# Patient Record
Sex: FEMALE | Race: WHITE | URBAN - METROPOLITAN AREA
[De-identification: names, ages, dates, MRNs, and addresses within clinical notes are randomized per-mention and may not be internally consistent; named-entity substitution may affect disease eponyms.]

---

## 2020-10-20 ENCOUNTER — OFFICE VISIT (OUTPATIENT)
Dept: URBAN - METROPOLITAN AREA CLINIC 36 | Facility: CLINIC | Age: 75
End: 2020-10-20
Payer: MEDICARE

## 2020-10-20 PROCEDURE — 92014 COMPRE OPH EXAM EST PT 1/>: CPT | Performed by: OPHTHALMOLOGY

## 2020-10-20 PROCEDURE — 92134 CPTRZ OPH DX IMG PST SGM RTA: CPT | Performed by: OPHTHALMOLOGY

## 2020-10-20 PROCEDURE — 67028 INJECTION EYE DRUG: CPT | Performed by: OPHTHALMOLOGY

## 2020-10-20 ASSESSMENT — INTRAOCULAR PRESSURE
OS: 19
OD: 23

## 2020-10-20 NOTE — IMPRESSION/PLAN
Impression: Age-related nuclear cataract, left eye: H25.12. Plan: Patient notes increased issues with reading. There is mild progression of the cataract. Recommend continued observation given good vision.

## 2020-10-20 NOTE — IMPRESSION/PLAN
Impression: Glaucoma Suspect Plan: Patient is a glaucoma suspect based on IOP and ON appearance. Recommend increased use of timolol OD to BID as IOP remains borderline, ras OD.

## 2020-10-20 NOTE — IMPRESSION/PLAN
Impression: H/o Macular cyst, hole, or pseudohole, right eye: H35.341. OD.
s/p PPV, MP, GAS x MH OD Plan: stable.

## 2020-10-20 NOTE — IMPRESSION/PLAN
Impression: Central retinal vein occlusion, bi, with macular edema: H34.8130. OD: h/o ischemic CRVO with CME; h/o injections without improvement. OS:  h/o Avastin OS q 6 wks (Dr. Dk Lew) -s/p Eylea OS last 08/18/20 -hx of recurrence at 9.5 weeks Plan: OD: Exam and OCT demonstrates stable CME. Patient notes no improvement in past with Anti-VEGF injections, so will continue with observation. 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 OS: Exam and OCT demonstrates trace recurrence of CME today after last injection 9 weeks ago. Recommend continuing with Eylea today (will use Eylea given accumulation of SO droplets and symptomatic floaters to patient with Avastin); will maintain 8-9 wk treatment interval.  R/B/A's discussed. Patient elects to proceed. Intravitreal Eylea injected OS today without complication. RTC: 8-9 straight Eylea OS.  #2/3

## 2020-10-20 NOTE — IMPRESSION/PLAN
Impression: Puckering of macula, left eye: H35.372. OS. Plan: Exam and OCT demonstrate a stable Macular Pucker. The diagnosis, natural history, and prognosis of ERMs, as well as the risks and benefits of PPV/MP versus observation were discussed at length. Given the patient's current visual acuity and minimal hindrance on activities of daily living, observation was recommended at this time. Patient notes occasional blurriness OS.   Fortunately, the ERM has not worsened and vision remains 20/20/

## 2020-10-20 NOTE — IMPRESSION/PLAN
Impression: Dry eye syndrome of bilateral lacrimal glands: H04.123. Plan: Recommend using AT's as needed.

## 2020-12-15 ENCOUNTER — PROCEDURE (OUTPATIENT)
Dept: URBAN - METROPOLITAN AREA CLINIC 36 | Facility: CLINIC | Age: 75
End: 2020-12-15
Payer: MEDICARE

## 2020-12-15 PROCEDURE — 67028 INJECTION EYE DRUG: CPT | Performed by: OPHTHALMOLOGY

## 2020-12-15 ASSESSMENT — INTRAOCULAR PRESSURE
OD: 21
OS: 12

## 2021-02-09 ENCOUNTER — PROCEDURE (OUTPATIENT)
Dept: URBAN - METROPOLITAN AREA CLINIC 36 | Facility: CLINIC | Age: 76
End: 2021-02-09
Payer: MEDICARE

## 2021-02-09 PROCEDURE — 67028 INJECTION EYE DRUG: CPT | Performed by: OPHTHALMOLOGY

## 2021-02-09 PROCEDURE — 92134 CPTRZ OPH DX IMG PST SGM RTA: CPT | Performed by: OPHTHALMOLOGY

## 2021-02-09 ASSESSMENT — INTRAOCULAR PRESSURE
OS: 13
OD: 20

## 2021-04-06 ENCOUNTER — OFFICE VISIT (OUTPATIENT)
Dept: URBAN - METROPOLITAN AREA CLINIC 36 | Facility: CLINIC | Age: 76
End: 2021-04-06
Payer: MEDICARE

## 2021-04-06 DIAGNOSIS — Z96.1 PRESENCE OF INTRAOCULAR LENS: ICD-10-CM

## 2021-04-06 PROCEDURE — 92014 COMPRE OPH EXAM EST PT 1/>: CPT | Performed by: OPHTHALMOLOGY

## 2021-04-06 PROCEDURE — 67028 INJECTION EYE DRUG: CPT | Performed by: OPHTHALMOLOGY

## 2021-04-06 PROCEDURE — 92134 CPTRZ OPH DX IMG PST SGM RTA: CPT | Performed by: OPHTHALMOLOGY

## 2021-04-06 ASSESSMENT — INTRAOCULAR PRESSURE
OD: 18
OS: 20

## 2021-04-06 NOTE — IMPRESSION/PLAN
Impression: Central retinal vein occlusion, bi, with macular edema: H34.8130. OD: h/o ischemic CRVO with CME; h/o injections without improvement. OS:  h/o Avastin OS q 6 wks (Dr. Roman Portillo) -s/p Eylea OS last 02/09/2021 -hx of recurrence at 9.5 weeks Plan: OD: Exam and OCT demonstrates stable CME. Patient notes no improvement in past with Anti-VEGF injections, so will continue with observation. 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 OS: Exam and OCT demonstrates reslved CME today after last injection 8 weeks ago. Recommend continuing with Eylea today (will use Eylea given accumulation of SO droplets and symptomatic floaters to patient with Avastin); will try to extend to 9 wk treatment interval.  R/B/A's discussed. Patient elects to proceed. Intravitreal Eylea injected OS today without complication.  

RTC: 9 wk re-eval with OCT OU, poss Eylea OS

## 2021-04-06 NOTE — IMPRESSION/PLAN
Impression: Puckering of macula, left eye: H35.372. OS. Plan: Exam and OCT demonstrate a stable Macular Pucker. The diagnosis, natural history, and prognosis of ERMs, as well as the risks and benefits of PPV/MP versus observation were discussed at length. Given the patient's current visual acuity and minimal hindrance on activities of daily living, observation was recommended at this time. Patient notes occasional blurriness OS.   Fortunately, the ERM has not worsened and vision remains 20/20

## 2021-04-06 NOTE — IMPRESSION/PLAN
Impression: Glaucoma Suspect Plan: Patient is a glaucoma suspect based on IOP and ON appearance. Recommend increased use of timolol OD to BID as IOP remains borderline, ras OD.  Also follows with Dr. Preeti Dailey

## 2021-06-08 ENCOUNTER — OFFICE VISIT (OUTPATIENT)
Dept: URBAN - METROPOLITAN AREA CLINIC 36 | Facility: CLINIC | Age: 76
End: 2021-06-08
Payer: MEDICARE

## 2021-06-08 DIAGNOSIS — H34.8130 CENTRAL RETINAL VEIN OCCLUSION, BI, WITH MACULAR EDEMA: Primary | ICD-10-CM

## 2021-06-08 DIAGNOSIS — H04.123 DRY EYE SYNDROME OF BILATERAL LACRIMAL GLANDS: ICD-10-CM

## 2021-06-08 DIAGNOSIS — H35.341 MACULAR CYST, HOLE, OR PSEUDOHOLE, RIGHT EYE: ICD-10-CM

## 2021-06-08 PROCEDURE — 92134 CPTRZ OPH DX IMG PST SGM RTA: CPT | Performed by: OPHTHALMOLOGY

## 2021-06-08 PROCEDURE — 92012 INTRM OPH EXAM EST PATIENT: CPT | Performed by: OPHTHALMOLOGY

## 2021-06-08 PROCEDURE — 67028 INJECTION EYE DRUG: CPT | Performed by: OPHTHALMOLOGY

## 2021-06-08 ASSESSMENT — INTRAOCULAR PRESSURE
OS: 23
OD: 26

## 2021-06-08 NOTE — IMPRESSION/PLAN
Impression: Central retinal vein occlusion, bi, with macular edema: H34.8130. OD: h/o ischemic CRVO with CME; h/o injections without improvement. OS:  h/o Avastin OS q 6 wks (Dr. Nathalie Lombardi) -s/p Eylea OS last 04/06/2021 -hx of recurrence at 9.5 weeks Plan: OD: Exam and OCT demonstrates stable CME. Patient notes no improvement in past with Anti-VEGF injections, so will continue with observation. 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 OS: Exam and OCT demonstrates reslved CME today after last injection 9 weeks ago. Recommend continuing with Eylea today (will use Eylea given accumulation of SO droplets and symptomatic floaters to patient with Avastin); will try to extend to 10 wk treatment interval.  R/B/A's discussed. Patient elects to proceed. Intravitreal Eylea injected OS today without complication.  

RTC: 10 wk re-eval with OCT OU, poss Eylea OS

## 2021-06-08 NOTE — IMPRESSION/PLAN
Impression: Glaucoma Suspect Plan: Patient is a glaucoma suspect based on IOP and ON appearance. Recommend increased use of timolol OD to BID as IOP remains borderline.  Also follows with Dr. Elma Washburn

## 2021-08-17 ENCOUNTER — OFFICE VISIT (OUTPATIENT)
Dept: URBAN - METROPOLITAN AREA CLINIC 36 | Facility: CLINIC | Age: 76
End: 2021-08-17
Payer: MEDICARE

## 2021-08-17 PROCEDURE — 92134 CPTRZ OPH DX IMG PST SGM RTA: CPT | Performed by: OPHTHALMOLOGY

## 2021-08-17 PROCEDURE — 67028 INJECTION EYE DRUG: CPT | Performed by: OPHTHALMOLOGY

## 2021-08-17 PROCEDURE — 92012 INTRM OPH EXAM EST PATIENT: CPT | Performed by: OPHTHALMOLOGY

## 2021-08-17 ASSESSMENT — INTRAOCULAR PRESSURE
OD: 20
OS: 20

## 2021-08-17 NOTE — IMPRESSION/PLAN
Impression: Glaucoma Suspect Plan: Patient is a glaucoma suspect based on IOP and ON appearance. Recommend increased use of timolol OD to BID as IOP remains borderline.  Also follows with Dr. Christelle Montejo

## 2021-08-17 NOTE — IMPRESSION/PLAN
Impression: Central retinal vein occlusion, bi, with macular edema: H34.8130. OD: h/o ischemic CRVO with CME; h/o injections without improvement. OS:  h/o Avastin OS q 6 wks (Dr. Brianne Louise) -s/p Eylea OS last 06/08/2021 -hx of recurrence at 9.5 weeks Plan: OD: Exam and OCT demonstrates stable CME. Patient notes no improvement in past with Anti-VEGF injections, so will continue with observation. 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 OS: Exam and OCT demonstrates very mild recurrence of CME today after extending last injection from 9 to 10 weeks ago. Recommend continuing with Eylea today (will use Eylea given accumulation of SO droplets and symptomatic floaters to patient with Avastin); will taper to 9 wk treatment interval.  R/B/A's discussed. Patient elects to proceed. Intravitreal Eylea injected OS today without complication.  

RTC: 9 wk re-eval with OCT OU, poss Eylea OS

## 2021-10-18 ENCOUNTER — OFFICE VISIT (OUTPATIENT)
Dept: URBAN - METROPOLITAN AREA CLINIC 36 | Facility: CLINIC | Age: 76
End: 2021-10-18
Payer: MEDICARE

## 2021-10-18 DIAGNOSIS — H25.12 AGE-RELATED NUCLEAR CATARACT, LEFT EYE: ICD-10-CM

## 2021-10-18 PROCEDURE — 67028 INJECTION EYE DRUG: CPT | Performed by: OPHTHALMOLOGY

## 2021-10-18 PROCEDURE — 92134 CPTRZ OPH DX IMG PST SGM RTA: CPT | Performed by: OPHTHALMOLOGY

## 2021-10-18 PROCEDURE — 92012 INTRM OPH EXAM EST PATIENT: CPT | Performed by: OPHTHALMOLOGY

## 2021-10-18 ASSESSMENT — INTRAOCULAR PRESSURE
OS: 18
OD: 20

## 2021-10-18 NOTE — IMPRESSION/PLAN
Impression: Central retinal vein occlusion, bi, with macular edema: H34.8130. OD: h/o ischemic CRVO with CME; h/o injections without improvement. OS:  h/o Avastin OS q 6 wks (Dr. Neftaly Greer) -s/p Eylea OS last 08/17/2021 -hx of recurrence at 9.5 weeks Plan: OD: Exam and OCT demonstrates stable CME. Patient notes no improvement in past with Anti-VEGF injections, so will continue with observation. 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 OS: Exam and OCT demonstrates very mild recurrence of CME today after extending last injection from 9 to 10 weeks ago. Recommend continuing with Eylea today (will use Eylea given accumulation of SO droplets and symptomatic floaters to patient with Avastin); will taper to 9 wk treatment interval.  R/B/A's discussed. Patient elects to proceed. Intravitreal Eylea injected OS today without complication.  

RTC: 9 wk re-eval with OCT OU, poss Eylea OS

## 2021-10-18 NOTE — IMPRESSION/PLAN
Impression: Glaucoma Suspect Plan: Patient is a glaucoma suspect based on IOP and ON appearance. Recommend increased use of timolol OD to BID as IOP remains borderline.  Also follows with Dr. Glenda Bales

## 2021-10-18 NOTE — IMPRESSION/PLAN
Impression: Dry eye syndrome of bilateral lacrimal glands: H04.123. Plan: Patient notes occ pain and tearing OD. Exam demonstrates PEE. Discussed R/B/A of ATs, PFATs, Restasis, vs punctal plugs.  Rec increased ATs

## 2022-02-21 ENCOUNTER — OFFICE VISIT (OUTPATIENT)
Dept: URBAN - METROPOLITAN AREA CLINIC 36 | Facility: CLINIC | Age: 77
End: 2022-02-21
Payer: MEDICARE

## 2022-02-21 DIAGNOSIS — H35.372 PUCKERING OF MACULA, LEFT EYE: ICD-10-CM

## 2022-02-21 DIAGNOSIS — H40.9 GLAUCOMA: ICD-10-CM

## 2022-02-21 PROCEDURE — 92014 COMPRE OPH EXAM EST PT 1/>: CPT | Performed by: OPHTHALMOLOGY

## 2022-02-21 PROCEDURE — 67028 INJECTION EYE DRUG: CPT | Performed by: OPHTHALMOLOGY

## 2022-02-21 PROCEDURE — 92134 CPTRZ OPH DX IMG PST SGM RTA: CPT | Performed by: OPHTHALMOLOGY

## 2022-02-21 ASSESSMENT — INTRAOCULAR PRESSURE
OS: 20
OD: 23

## 2022-02-21 NOTE — IMPRESSION/PLAN
Impression: Central retinal vein occlusion, bi, with macular edema: H34.8130. OD: h/o ischemic CRVO with CME; h/o injections without improvement. OS:  h/o Avastin OS q 6 wks (Dr. Samson Rivas) -s/p Eylea OS last 12/20/2021 -hx of recurrence at 9.5 weeks Plan: OD: Exam and OCT demonstrates stable CME. Patient notes no improvement in past with Anti-VEGF injections, so will continue with observation. 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 OS: Exam and OCT demonstrates very mild recurrence of CME today after extending last injection from 9 to 10 weeks ago. Recommend continuing with Eylea today (will use Eylea given accumulation of SO droplets and symptomatic floaters to patient with Avastin); will taper to 9 wk treatment interval.  R/B/A's discussed. Patient elects to proceed. Intravitreal Eylea injected OS today without complication.  

RTC: 9 wk re-eval with OCT OU, poss Eylea OS

## 2022-02-21 NOTE — IMPRESSION/PLAN
Impression: Glaucoma Suspect Plan: Patient is a glaucoma suspect based on IOP and ON appearance. Recommend increased use of timolol OD to BID as IOP remains borderline.  Also follows with Dr. Tom Schwab

## 2022-04-25 ENCOUNTER — OFFICE VISIT (OUTPATIENT)
Dept: URBAN - METROPOLITAN AREA CLINIC 36 | Facility: CLINIC | Age: 77
End: 2022-04-25
Payer: MEDICARE

## 2022-04-25 DIAGNOSIS — H35.372 PUCKERING OF MACULA, LEFT EYE: ICD-10-CM

## 2022-04-25 DIAGNOSIS — H34.8130 CENTRAL RETINAL VEIN OCCLUSION, BI, WITH MACULAR EDEMA: Primary | ICD-10-CM

## 2022-04-25 DIAGNOSIS — H35.341 MACULAR CYST, HOLE, OR PSEUDOHOLE, RIGHT EYE: ICD-10-CM

## 2022-04-25 DIAGNOSIS — H04.123 DRY EYE SYNDROME OF BILATERAL LACRIMAL GLANDS: ICD-10-CM

## 2022-04-25 DIAGNOSIS — H40.9 GLAUCOMA: ICD-10-CM

## 2022-04-25 PROCEDURE — 92012 INTRM OPH EXAM EST PATIENT: CPT | Performed by: OPHTHALMOLOGY

## 2022-04-25 PROCEDURE — 67028 INJECTION EYE DRUG: CPT | Performed by: OPHTHALMOLOGY

## 2022-04-25 PROCEDURE — 92134 CPTRZ OPH DX IMG PST SGM RTA: CPT | Performed by: OPHTHALMOLOGY

## 2022-04-25 ASSESSMENT — INTRAOCULAR PRESSURE
OD: 18
OS: 17

## 2022-04-25 NOTE — IMPRESSION/PLAN
Impression: Glaucoma Suspect Plan: Patient is a glaucoma suspect based on IOP and ON appearance. Recommend increased use of timolol OD to BID as IOP remains borderline.  Also follows with Dr. Sotero Acosta

## 2022-04-25 NOTE — IMPRESSION/PLAN
Impression: Central retinal vein occlusion, bi, with macular edema: H34.8130. OD: h/o ischemic CRVO with CME; h/o injections without improvement. OS:  h/o Avastin OS q 6 wks (Dr. Mery Yang) -s/p Eylea OS last 02/21/2022 -hx of recurrence at 9.5 weeks Plan: OD: Exam and OCT demonstrates stable CME. Patient notes no improvement in past with Anti-VEGF injections, so will continue with observation. 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 OS: Exam and OCT demonstrates very mild persistence of CME today after tapering last injection from 10 to 9 weeks ago. Recommend continuing with Eylea today (will use Eylea given accumulation of SO droplets and symptomatic floaters to patient with Avastin); will maintain 9 wk treatment interval.  R/B/A's discussed. Patient elects to proceed. Intravitreal Eylea injected OS today without complication.  

RTC: 9 wk re-eval with OCT OU, poss Eylea OS

## 2022-06-27 ENCOUNTER — OFFICE VISIT (OUTPATIENT)
Dept: URBAN - METROPOLITAN AREA CLINIC 36 | Facility: CLINIC | Age: 77
End: 2022-06-27
Payer: MEDICARE

## 2022-06-27 DIAGNOSIS — H34.8130 CENTRAL RETINAL VEIN OCCLUSION, BI, WITH MACULAR EDEMA: Primary | ICD-10-CM

## 2022-06-27 DIAGNOSIS — H40.9 GLAUCOMA: ICD-10-CM

## 2022-06-27 DIAGNOSIS — H35.341 MACULAR CYST, HOLE, OR PSEUDOHOLE, RIGHT EYE: ICD-10-CM

## 2022-06-27 DIAGNOSIS — H04.123 DRY EYE SYNDROME OF BILATERAL LACRIMAL GLANDS: ICD-10-CM

## 2022-06-27 DIAGNOSIS — H35.372 PUCKERING OF MACULA, LEFT EYE: ICD-10-CM

## 2022-06-27 PROCEDURE — 92134 CPTRZ OPH DX IMG PST SGM RTA: CPT | Performed by: OPHTHALMOLOGY

## 2022-06-27 PROCEDURE — 67028 INJECTION EYE DRUG: CPT | Performed by: OPHTHALMOLOGY

## 2022-06-27 PROCEDURE — 92012 INTRM OPH EXAM EST PATIENT: CPT | Performed by: OPHTHALMOLOGY

## 2022-06-27 NOTE — IMPRESSION/PLAN
Impression: Central retinal vein occlusion, bi, with macular edema: H34.8130. OD: h/o ischemic CRVO with CME; h/o injections without improvement. OS:  h/o Avastin OS q 6 wks (Dr. Susan Seay) -s/p Eylea OS last 02/21/2022 -hx of recurrence at 9.5 weeks Plan: OD: Exam and OCT demonstrates stable CME. Patient notes no improvement in past with Anti-VEGF injections, so will continue with observation. 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 OS: Exam and OCT demonstrates very mild persistence of CME today after tapering last injection from 10 to 9 weeks ago. Recommend continuing with Eylea today (will use Eylea given accumulation of SO droplets and symptomatic floaters to patient with Avastin); will maintain 9 wk treatment interval.  R/B/A's discussed. Patient elects to proceed. Intravitreal Eylea injected OS today without complication.  

RTC: 9 wk re-eval with OCT OU, poss Eylea OS

## 2022-08-22 ENCOUNTER — OFFICE VISIT (OUTPATIENT)
Dept: URBAN - METROPOLITAN AREA CLINIC 36 | Facility: CLINIC | Age: 77
End: 2022-08-22
Payer: MEDICARE

## 2022-08-22 DIAGNOSIS — H34.8130 CENTRAL RETINAL VEIN OCCLUSION, BI, WITH MACULAR EDEMA: Primary | ICD-10-CM

## 2022-08-22 DIAGNOSIS — H35.341 MACULAR CYST, HOLE, OR PSEUDOHOLE, RIGHT EYE: ICD-10-CM

## 2022-08-22 DIAGNOSIS — H40.9 GLAUCOMA: ICD-10-CM

## 2022-08-22 DIAGNOSIS — H04.123 DRY EYE SYNDROME OF BILATERAL LACRIMAL GLANDS: ICD-10-CM

## 2022-08-22 DIAGNOSIS — H35.372 PUCKERING OF MACULA, LEFT EYE: ICD-10-CM

## 2022-08-22 PROCEDURE — 99214 OFFICE O/P EST MOD 30 MIN: CPT | Performed by: OPHTHALMOLOGY

## 2022-08-22 PROCEDURE — 92134 CPTRZ OPH DX IMG PST SGM RTA: CPT | Performed by: OPHTHALMOLOGY

## 2022-08-22 PROCEDURE — 67028 INJECTION EYE DRUG: CPT | Performed by: OPHTHALMOLOGY

## 2022-08-22 ASSESSMENT — INTRAOCULAR PRESSURE
OD: 17
OS: 15

## 2022-08-22 NOTE — IMPRESSION/PLAN
Impression: Puckering of macula, left eye: H35.372. OS. Plan: Exam and OCT demonstrate a stable Macular Pucker. The diagnosis, natural history, and prognosis of ERMs, as well as the risks and benefits of PPV/MP versus observation were discussed at length. Given the patient's current visual acuity and minimal hindrance on activities of daily living, observation was recommended at this time. Patient notes occasional blurriness OS.   Fortunately, the ERM has not worsened and vision remains 20/20 Pt. lightheaded and dizzy during orthostatic VS.

## 2022-08-22 NOTE — IMPRESSION/PLAN
Impression: Central retinal vein occlusion, bi, with macular edema: H34.8130. OD: h/o ischemic CRVO with CME; h/o injections without improvement. OS:  h/o Avastin OS q 6 wks (Dr. Tanya Warren) -s/p Eylea OS last 06/27/2022 -hx of recurrence at 9.5 weeks Plan: OD: Exam and OCT demonstrates stable CME. Patient notes no improvement in past with Anti-VEGF injections, so will continue with observation. 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 OS: Exam and OCT demonstrates very mild persistence of CME today after tapering last injection from 10 to 9 weeks ago. Recommend continuing with Eylea today (will use Eylea given accumulation of SO droplets and symptomatic floaters to patient with Avastin); will maintain 9 wk treatment interval.  R/B/A's discussed. Patient elects to proceed. Intravitreal Eylea injected OS today without complication.  

RTC: 9 wk re-eval with OCT OU, poss Eylea OS

## 2022-08-22 NOTE — IMPRESSION/PLAN
Impression: Glaucoma Suspect Plan: Patient is a glaucoma suspect based on IOP and ON appearance. Recommend increased use of timolol OD to BID as IOP remains borderline.  Also follows with Dr. Thomas Serrano

## 2022-10-17 ENCOUNTER — OFFICE VISIT (OUTPATIENT)
Dept: URBAN - METROPOLITAN AREA CLINIC 36 | Facility: CLINIC | Age: 77
End: 2022-10-17
Payer: MEDICARE

## 2022-10-17 DIAGNOSIS — H34.8130 CENTRAL RETINAL VEIN OCCLUSION, BI, WITH MACULAR EDEMA: Primary | ICD-10-CM

## 2022-10-17 DIAGNOSIS — H35.341 MACULAR CYST, HOLE, OR PSEUDOHOLE, RIGHT EYE: ICD-10-CM

## 2022-10-17 DIAGNOSIS — H35.372 PUCKERING OF MACULA, LEFT EYE: ICD-10-CM

## 2022-10-17 DIAGNOSIS — H25.12 AGE-RELATED NUCLEAR CATARACT, LEFT EYE: ICD-10-CM

## 2022-10-17 DIAGNOSIS — H04.123 DRY EYE SYNDROME OF BILATERAL LACRIMAL GLANDS: ICD-10-CM

## 2022-10-17 DIAGNOSIS — H40.9 GLAUCOMA: ICD-10-CM

## 2022-10-17 PROCEDURE — 67028 INJECTION EYE DRUG: CPT | Performed by: OPHTHALMOLOGY

## 2022-10-17 PROCEDURE — 92134 CPTRZ OPH DX IMG PST SGM RTA: CPT | Performed by: OPHTHALMOLOGY

## 2022-10-17 PROCEDURE — 92012 INTRM OPH EXAM EST PATIENT: CPT | Performed by: OPHTHALMOLOGY

## 2022-10-17 ASSESSMENT — INTRAOCULAR PRESSURE
OD: 28
OS: 23

## 2022-10-17 NOTE — IMPRESSION/PLAN
Impression: Central retinal vein occlusion, bi, with macular edema: H34.8130. OD: h/o ischemic CRVO with CME; h/o injections without improvement. OS:  h/o Avastin OS q 6 wks (Dr. Low Frost) -s/p Eylea OS last 08/22/2022 -hx of recurrence at 9.5 weeks Plan: OD: Exam and OCT demonstrates stable CME. Patient notes no improvement in past with Anti-VEGF injections, so will continue with observation. 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 OS: Exam and OCT demonstrates very mild persistence of CME today after tapering last injection from 10 to 8 weeks ago. Recommend continuing with Eylea today (will use Eylea given accumulation of SO droplets and symptomatic floaters to patient with Avastin); will extend back to 10 wk treatment interval.  R/B/A's discussed. Patient elects to proceed. Intravitreal Eylea injected OS today without complication.  

RTC: 10 wk re-eval with OCT OU, poss Eylea OS

## 2022-10-17 NOTE — IMPRESSION/PLAN
Impression: Glaucoma Suspect Plan: Patient is a glaucoma suspect based on IOP and ON appearance. IOP borderline today. Recommend increased use of timolol OD to BID.  Also follows with Dr. Glenda Balse

## 2022-10-17 NOTE — IMPRESSION/PLAN
Impression: Puckering of macula, left eye: H35.372. OS. Plan: Exam and OCT demonstrate a stable Macular Pucker. The diagnosis, natural history, and prognosis of ERMs, as well as the risks and benefits of PPV/MP versus observation were discussed at length. Given the patient's current visual acuity and minimal hindrance on activities of daily living, observation was recommended at this time. Patient notes occasional blurriness OS.   Fortunately, the ERM has not worsened and vision remains decent

## 2022-12-12 ENCOUNTER — OFFICE VISIT (OUTPATIENT)
Dept: URBAN - METROPOLITAN AREA CLINIC 36 | Facility: CLINIC | Age: 77
End: 2022-12-12
Payer: MEDICARE

## 2022-12-12 DIAGNOSIS — H34.8130 CENTRAL RETINAL VEIN OCCLUSION, BI, WITH MACULAR EDEMA: Primary | ICD-10-CM

## 2022-12-12 DIAGNOSIS — H04.123 DRY EYE SYNDROME OF BILATERAL LACRIMAL GLANDS: ICD-10-CM

## 2022-12-12 DIAGNOSIS — H35.341 MACULAR CYST, HOLE, OR PSEUDOHOLE, RIGHT EYE: ICD-10-CM

## 2022-12-12 DIAGNOSIS — H40.9 GLAUCOMA: ICD-10-CM

## 2022-12-12 DIAGNOSIS — H35.372 PUCKERING OF MACULA, LEFT EYE: ICD-10-CM

## 2022-12-12 PROCEDURE — 99214 OFFICE O/P EST MOD 30 MIN: CPT | Performed by: OPHTHALMOLOGY

## 2022-12-12 PROCEDURE — 67028 INJECTION EYE DRUG: CPT | Performed by: OPHTHALMOLOGY

## 2022-12-12 PROCEDURE — 92134 CPTRZ OPH DX IMG PST SGM RTA: CPT | Performed by: OPHTHALMOLOGY

## 2022-12-12 ASSESSMENT — INTRAOCULAR PRESSURE
OS: 13
OD: 14

## 2022-12-12 NOTE — IMPRESSION/PLAN
Impression: Glaucoma Suspect Plan: Patient is a glaucoma suspect based on IOP and ON appearance. IOP borderline today. Recommend increased use of timolol OD to BID.  Also follows with Dr. Yusra Byrnes

## 2022-12-12 NOTE — IMPRESSION/PLAN
Impression: Central retinal vein occlusion, bi, with macular edema: H34.8130. OD: h/o ischemic CRVO with CME; h/o injections without improvement. OS:  h/o Avastin OS q 6 wks (Dr. Bonita Weber) -s/p Eylea OS last 10/17/2022 -hx of recurrence at 9.5 weeks Plan: OD: Exam and OCT demonstrates stable CME. Patient notes no improvement in past with Anti-VEGF injections, so will continue with observation. 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 OS: Exam and OCT demonstrates very mild persistence of CME today after tapering last injection from 10 to 8 weeks ago. Recommend continuing with Eylea today (will use Eylea given accumulation of SO droplets and symptomatic floaters to patient with Avastin); will extend back to 10 wk treatment interval.  R/B/A's discussed. Patient elects to proceed. Intravitreal Eylea injected OS today without complication.  

RTC: 10 wk re-eval with OCT OU, poss Eylea OS

## 2023-02-20 ENCOUNTER — OFFICE VISIT (OUTPATIENT)
Dept: URBAN - METROPOLITAN AREA CLINIC 36 | Facility: CLINIC | Age: 78
End: 2023-02-20
Payer: MEDICARE

## 2023-02-20 DIAGNOSIS — H04.123 DRY EYE SYNDROME OF BILATERAL LACRIMAL GLANDS: ICD-10-CM

## 2023-02-20 DIAGNOSIS — H35.341 MACULAR CYST, HOLE, OR PSEUDOHOLE, RIGHT EYE: ICD-10-CM

## 2023-02-20 DIAGNOSIS — H40.9 GLAUCOMA: ICD-10-CM

## 2023-02-20 DIAGNOSIS — H35.372 PUCKERING OF MACULA, LEFT EYE: ICD-10-CM

## 2023-02-20 DIAGNOSIS — H34.8130 CENTRAL RETINAL VEIN OCCLUSION, BI, WITH MACULAR EDEMA: Primary | ICD-10-CM

## 2023-02-20 PROCEDURE — 67028 INJECTION EYE DRUG: CPT | Performed by: OPHTHALMOLOGY

## 2023-02-20 PROCEDURE — 92134 CPTRZ OPH DX IMG PST SGM RTA: CPT | Performed by: OPHTHALMOLOGY

## 2023-02-20 PROCEDURE — 92012 INTRM OPH EXAM EST PATIENT: CPT | Performed by: OPHTHALMOLOGY

## 2023-02-20 ASSESSMENT — INTRAOCULAR PRESSURE
OD: 21
OS: 15

## 2023-02-20 NOTE — IMPRESSION/PLAN
Impression: Central retinal vein occlusion, bi, with macular edema: H34.8130. OD: h/o ischemic CRVO with CME; h/o injections without improvement. OS:  h/o Avastin OS q 6 wks (Dr. Samson Rivas) -s/p Eylea OS last 12/12/2022 -hx of recurrence at 9.5 weeks Plan: OD: Exam and OCT demonstrates stable CME. Patient notes no improvement in past with Anti-VEGF injections, so will continue with observation. 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 OS: Exam and OCT demonstrates very mild persistence of CME today after tapering last injection from 10 to 8 weeks ago. Recommend continuing with Eylea today (will use Eylea given accumulation of SO droplets and symptomatic floaters to patient with Avastin); will extend back to 10 wk treatment interval.  R/B/A's discussed. Patient elects to proceed. Intravitreal Eylea injected OS today without complication.  

RTC: 10 wk re-eval with OCT OU, poss Eylea OS

## 2023-02-20 NOTE — IMPRESSION/PLAN
Impression: Glaucoma Suspect Plan: Patient is a glaucoma suspect based on IOP and ON appearance. IOP borderline today. Recommend increased use of timolol OD to BID.  Also follows with Dr. Stacey Glover

## 2023-05-01 ENCOUNTER — OFFICE VISIT (OUTPATIENT)
Dept: URBAN - METROPOLITAN AREA CLINIC 36 | Facility: CLINIC | Age: 78
End: 2023-05-01
Payer: MEDICARE

## 2023-05-01 DIAGNOSIS — H35.372 PUCKERING OF MACULA, LEFT EYE: ICD-10-CM

## 2023-05-01 DIAGNOSIS — H25.12 AGE-RELATED NUCLEAR CATARACT, LEFT EYE: ICD-10-CM

## 2023-05-01 DIAGNOSIS — H34.8130 CENTRAL RETINAL VEIN OCCLUSION, BI, WITH MACULAR EDEMA: Primary | ICD-10-CM

## 2023-05-01 DIAGNOSIS — H40.9 GLAUCOMA: ICD-10-CM

## 2023-05-01 DIAGNOSIS — H35.341 MACULAR CYST, HOLE, OR PSEUDOHOLE, RIGHT EYE: ICD-10-CM

## 2023-05-01 DIAGNOSIS — H04.123 DRY EYE SYNDROME OF BILATERAL LACRIMAL GLANDS: ICD-10-CM

## 2023-05-01 PROCEDURE — 92134 CPTRZ OPH DX IMG PST SGM RTA: CPT | Performed by: OPHTHALMOLOGY

## 2023-05-01 PROCEDURE — 92012 INTRM OPH EXAM EST PATIENT: CPT | Performed by: OPHTHALMOLOGY

## 2023-05-01 PROCEDURE — 67028 INJECTION EYE DRUG: CPT | Performed by: OPHTHALMOLOGY

## 2023-05-01 ASSESSMENT — INTRAOCULAR PRESSURE
OD: 20
OS: 17

## 2023-05-01 NOTE — IMPRESSION/PLAN
Impression: Central retinal vein occlusion, bi, with macular edema: H34.8130. OD: h/o ischemic CRVO with CME; h/o injections without improvement. OS:  h/o Avastin OS q 6 wks (Dr. Tanya Warren) -s/p Eylea OS last 02/20/2023 -hx of recurrence at 9.5 weeks Plan: OD: Exam and OCT demonstrates stable CME. Patient notes no improvement in past with Anti-VEGF injections, so will continue with observation. 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 OS: Exam and OCT demonstrates very mild persistence of CME today after extending to 10. Recommend continuing with Eylea today (will use Eylea given accumulation of SO droplets and symptomatic floaters to patient with Avastin); will maintain 10 wk treatment interval.  R/B/A's discussed. Patient elects to proceed. Intravitreal Eylea injected OS today without complication.  

RTC: 10 wk re-eval with OCT OU, poss Eylea OS

## 2023-05-01 NOTE — IMPRESSION/PLAN
Impression: Glaucoma Suspect Plan: Patient is a glaucoma suspect based on IOP and ON appearance. IOP borderline today. Recommend increased use of timolol OD to BID.  Also follows with Dr. Yvette Lomax

## 2023-07-10 ENCOUNTER — OFFICE VISIT (OUTPATIENT)
Dept: URBAN - METROPOLITAN AREA CLINIC 36 | Facility: CLINIC | Age: 78
End: 2023-07-10
Payer: MEDICARE

## 2023-07-10 DIAGNOSIS — H35.341 MACULAR CYST, HOLE, OR PSEUDOHOLE, RIGHT EYE: ICD-10-CM

## 2023-07-10 DIAGNOSIS — H34.8130 CENTRAL RETINAL VEIN OCCLUSION, BI, WITH MACULAR EDEMA: Primary | ICD-10-CM

## 2023-07-10 DIAGNOSIS — H40.9 GLAUCOMA: ICD-10-CM

## 2023-07-10 DIAGNOSIS — H04.123 DRY EYE SYNDROME OF BILATERAL LACRIMAL GLANDS: ICD-10-CM

## 2023-07-10 DIAGNOSIS — H35.372 PUCKERING OF MACULA, LEFT EYE: ICD-10-CM

## 2023-07-10 DIAGNOSIS — H25.12 AGE-RELATED NUCLEAR CATARACT, LEFT EYE: ICD-10-CM

## 2023-07-10 PROCEDURE — 99214 OFFICE O/P EST MOD 30 MIN: CPT | Performed by: OPHTHALMOLOGY

## 2023-07-10 PROCEDURE — 67028 INJECTION EYE DRUG: CPT | Performed by: OPHTHALMOLOGY

## 2023-07-10 PROCEDURE — 92134 CPTRZ OPH DX IMG PST SGM RTA: CPT | Performed by: OPHTHALMOLOGY

## 2023-07-10 ASSESSMENT — INTRAOCULAR PRESSURE
OS: 18
OD: 17

## 2023-07-10 NOTE — IMPRESSION/PLAN
Impression: Age-related nuclear cataract, left eye: H25.12. Plan: Patient notes increased issues with reading. There is mild progression of the cataract. Recommend evaluation for surgery given decline.

## 2023-07-10 NOTE — IMPRESSION/PLAN
Impression: Glaucoma Suspect Plan: Patient is a glaucoma suspect based on IOP and ON appearance. IOP borderline today. Recommend increased use of timolol OD to BID.  Also follows with Dr. Brittany Llamas

## 2023-07-10 NOTE — IMPRESSION/PLAN
Impression: Central retinal vein occlusion, bi, with macular edema: H34.8130. OD: h/o ischemic CRVO with CME; h/o injections without improvement. OS:  h/o Avastin OS q 6 wks (Dr. Isaias Lazcano) -s/p Eylea OS last 05/01/2023 -hx of recurrence at 9.5 weeks Plan: OD: Exam and OCT demonstrates stable CME. Patient notes no improvement in past with Anti-VEGF injections, so will continue with observation. 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 OS: Exam and OCT demonstrates very mild persistence of CME today after extending to 10. Recommend continuing with Eylea today (will use Eylea given accumulation of SO droplets and symptomatic floaters to patient with Avastin); will maintain 10 wk treatment interval.  R/B/A's discussed. Patient elects to proceed. Intravitreal Eylea injected OS today without complication.  

RTC: 10 wk re-eval with OCT OU, poss Eylea OS

## 2023-09-18 ENCOUNTER — OFFICE VISIT (OUTPATIENT)
Facility: LOCATION | Age: 78
End: 2023-09-18
Payer: MEDICARE

## 2023-09-18 DIAGNOSIS — H04.123 DRY EYE SYNDROME OF BILATERAL LACRIMAL GLANDS: Primary | ICD-10-CM

## 2023-09-18 DIAGNOSIS — H34.8130 CENTRAL RETINAL VEIN OCCLUSION, BILATERAL, WITH MACULAR EDEMA: ICD-10-CM

## 2023-09-18 DIAGNOSIS — H40.9 GLAUCOMA: ICD-10-CM

## 2023-09-18 DIAGNOSIS — H35.341 MACULAR CYST, HOLE, OR PSEUDOHOLE, RIGHT EYE: ICD-10-CM

## 2023-09-18 DIAGNOSIS — H35.372 PUCKERING OF MACULA, LEFT EYE: ICD-10-CM

## 2023-09-18 PROCEDURE — 92134 CPTRZ OPH DX IMG PST SGM RTA: CPT | Performed by: OPHTHALMOLOGY

## 2023-09-18 PROCEDURE — 92014 COMPRE OPH EXAM EST PT 1/>: CPT | Performed by: OPHTHALMOLOGY

## 2023-09-18 PROCEDURE — 67028 INJECTION EYE DRUG: CPT | Performed by: OPHTHALMOLOGY

## 2023-09-18 ASSESSMENT — INTRAOCULAR PRESSURE
OD: 21
OS: 17

## 2023-12-11 PROCEDURE — 67028 INJECTION EYE DRUG: CPT | Performed by: OPHTHALMOLOGY

## 2023-12-11 PROCEDURE — 92134 CPTRZ OPH DX IMG PST SGM RTA: CPT | Performed by: OPHTHALMOLOGY

## 2023-12-11 PROCEDURE — 92014 COMPRE OPH EXAM EST PT 1/>: CPT | Performed by: OPHTHALMOLOGY

## 2024-02-05 ENCOUNTER — OFFICE VISIT (OUTPATIENT)
Facility: LOCATION | Age: 79
End: 2024-02-05
Payer: MEDICARE

## 2024-02-05 DIAGNOSIS — H35.372 PUCKERING OF MACULA, LEFT EYE: ICD-10-CM

## 2024-02-05 DIAGNOSIS — H35.341 MACULAR CYST, HOLE, OR PSEUDOHOLE, RIGHT EYE: ICD-10-CM

## 2024-02-05 DIAGNOSIS — H04.123 DRY EYE SYNDROME OF BILATERAL LACRIMAL GLANDS: ICD-10-CM

## 2024-02-05 DIAGNOSIS — H40.9 GLAUCOMA: ICD-10-CM

## 2024-02-05 DIAGNOSIS — H34.8130 CENTRAL RETINAL VEIN OCCLUSION, BI, WITH MACULAR EDEMA: Primary | ICD-10-CM

## 2024-02-05 PROCEDURE — 92134 CPTRZ OPH DX IMG PST SGM RTA: CPT | Performed by: OPHTHALMOLOGY

## 2024-02-05 PROCEDURE — 99214 OFFICE O/P EST MOD 30 MIN: CPT | Performed by: OPHTHALMOLOGY

## 2024-02-05 PROCEDURE — 67028 INJECTION EYE DRUG: CPT | Performed by: OPHTHALMOLOGY

## 2024-02-05 ASSESSMENT — INTRAOCULAR PRESSURE
OS: 17
OD: 22

## 2024-04-01 ENCOUNTER — OFFICE VISIT (OUTPATIENT)
Facility: LOCATION | Age: 79
End: 2024-04-01
Payer: MEDICARE

## 2024-04-01 DIAGNOSIS — H34.8130 CENTRAL RETINAL VEIN OCCLUSION, BILATERAL, WITH MACULAR EDEMA: Primary | ICD-10-CM

## 2024-04-01 PROCEDURE — 92134 CPTRZ OPH DX IMG PST SGM RTA: CPT | Performed by: OPHTHALMOLOGY

## 2024-04-01 PROCEDURE — 67028 INJECTION EYE DRUG: CPT | Performed by: OPHTHALMOLOGY

## 2024-04-01 ASSESSMENT — INTRAOCULAR PRESSURE
OD: 24
OS: 16

## 2024-06-10 ENCOUNTER — OFFICE VISIT (OUTPATIENT)
Facility: LOCATION | Age: 79
End: 2024-06-10
Payer: MEDICARE

## 2024-06-10 DIAGNOSIS — H34.8130 CENTRAL RETINAL VEIN OCCLUSION, BILATERAL, WITH MACULAR EDEMA: Primary | ICD-10-CM

## 2024-06-10 PROCEDURE — 92134 CPTRZ OPH DX IMG PST SGM RTA: CPT | Performed by: OPHTHALMOLOGY

## 2024-06-10 PROCEDURE — 67028 INJECTION EYE DRUG: CPT | Performed by: OPHTHALMOLOGY

## 2024-06-10 ASSESSMENT — INTRAOCULAR PRESSURE
OS: 18
OD: 20

## 2024-07-22 ENCOUNTER — OFFICE VISIT (OUTPATIENT)
Facility: LOCATION | Age: 79
End: 2024-07-22
Payer: MEDICARE

## 2024-07-22 DIAGNOSIS — H34.8130 CENTRAL RETINAL VEIN OCCLUSION, BILATERAL, WITH MACULAR EDEMA: Primary | ICD-10-CM

## 2024-07-22 PROCEDURE — 67028 INJECTION EYE DRUG: CPT | Performed by: OPHTHALMOLOGY

## 2024-07-22 PROCEDURE — 92134 CPTRZ OPH DX IMG PST SGM RTA: CPT | Performed by: OPHTHALMOLOGY

## 2024-07-22 ASSESSMENT — INTRAOCULAR PRESSURE
OS: 11
OD: 16

## 2024-09-16 ENCOUNTER — OFFICE VISIT (OUTPATIENT)
Facility: LOCATION | Age: 79
End: 2024-09-16
Payer: MEDICARE

## 2024-09-16 DIAGNOSIS — H40.9 GLAUCOMA: ICD-10-CM

## 2024-09-16 DIAGNOSIS — H25.12 AGE-RELATED NUCLEAR CATARACT, LEFT EYE: ICD-10-CM

## 2024-09-16 DIAGNOSIS — H35.372 PUCKERING OF MACULA, LEFT EYE: ICD-10-CM

## 2024-09-16 DIAGNOSIS — H04.123 DRY EYE SYNDROME OF BILATERAL LACRIMAL GLANDS: ICD-10-CM

## 2024-09-16 DIAGNOSIS — H34.8130 CENTRAL RETINAL VEIN OCCLUSION, BILATERAL, WITH MACULAR EDEMA: Primary | ICD-10-CM

## 2024-09-16 DIAGNOSIS — H35.341 MACULAR CYST, HOLE, OR PSEUDOHOLE, RIGHT EYE: ICD-10-CM

## 2024-09-16 PROCEDURE — 67028 INJECTION EYE DRUG: CPT | Performed by: OPHTHALMOLOGY

## 2024-09-16 PROCEDURE — 92134 CPTRZ OPH DX IMG PST SGM RTA: CPT | Performed by: OPHTHALMOLOGY

## 2024-09-16 PROCEDURE — 99214 OFFICE O/P EST MOD 30 MIN: CPT | Performed by: OPHTHALMOLOGY

## 2024-09-16 ASSESSMENT — INTRAOCULAR PRESSURE
OD: 12
OS: 13

## 2024-11-25 ENCOUNTER — OFFICE VISIT (OUTPATIENT)
Facility: LOCATION | Age: 79
End: 2024-11-25
Payer: MEDICARE

## 2024-11-25 DIAGNOSIS — H34.8130 CENTRAL RETINAL VEIN OCCLUSION, BILATERAL, WITH MACULAR EDEMA: Primary | ICD-10-CM

## 2024-11-25 PROCEDURE — 67028 INJECTION EYE DRUG: CPT | Performed by: OPHTHALMOLOGY

## 2024-11-25 PROCEDURE — 92134 CPTRZ OPH DX IMG PST SGM RTA: CPT | Performed by: OPHTHALMOLOGY

## 2024-11-25 ASSESSMENT — INTRAOCULAR PRESSURE
OD: 21
OS: 20

## 2025-02-03 ENCOUNTER — OFFICE VISIT (OUTPATIENT)
Facility: LOCATION | Age: 80
End: 2025-02-03
Payer: MEDICARE

## 2025-02-03 DIAGNOSIS — H34.8130 CENTRAL RETINAL VEIN OCCLUSION, BILATERAL, WITH MACULAR EDEMA: Primary | ICD-10-CM

## 2025-02-03 PROCEDURE — 67028 INJECTION EYE DRUG: CPT | Performed by: OPHTHALMOLOGY

## 2025-02-03 PROCEDURE — 92134 CPTRZ OPH DX IMG PST SGM RTA: CPT | Performed by: OPHTHALMOLOGY

## 2025-02-03 ASSESSMENT — INTRAOCULAR PRESSURE
OD: 14
OS: 12

## 2025-03-31 ENCOUNTER — OFFICE VISIT (OUTPATIENT)
Facility: LOCATION | Age: 80
End: 2025-03-31
Payer: MEDICARE

## 2025-03-31 DIAGNOSIS — H34.8130 CENTRAL RETINAL VEIN OCCLUSION, BILATERAL, WITH MACULAR EDEMA: Primary | ICD-10-CM

## 2025-03-31 PROCEDURE — 67028 INJECTION EYE DRUG: CPT | Performed by: OPHTHALMOLOGY

## 2025-03-31 PROCEDURE — 92134 CPTRZ OPH DX IMG PST SGM RTA: CPT | Performed by: OPHTHALMOLOGY

## 2025-03-31 ASSESSMENT — INTRAOCULAR PRESSURE
OD: 20
OS: 19

## 2025-06-09 ENCOUNTER — OFFICE VISIT (OUTPATIENT)
Facility: LOCATION | Age: 80
End: 2025-06-09
Payer: MEDICARE

## 2025-06-09 DIAGNOSIS — H25.12 AGE-RELATED NUCLEAR CATARACT, LEFT EYE: ICD-10-CM

## 2025-06-09 DIAGNOSIS — H34.8130 CENTRAL RETINAL VEIN OCCLUSION, BILATERAL, WITH MACULAR EDEMA: Primary | ICD-10-CM

## 2025-06-09 DIAGNOSIS — H35.341 MACULAR CYST, HOLE, OR PSEUDOHOLE, RIGHT EYE: ICD-10-CM

## 2025-06-09 DIAGNOSIS — H35.372 PUCKERING OF MACULA, LEFT EYE: ICD-10-CM

## 2025-06-09 DIAGNOSIS — H40.9 GLAUCOMA: ICD-10-CM

## 2025-06-09 PROCEDURE — 67028 INJECTION EYE DRUG: CPT | Performed by: OPHTHALMOLOGY

## 2025-06-09 PROCEDURE — 92134 CPTRZ OPH DX IMG PST SGM RTA: CPT | Performed by: OPHTHALMOLOGY

## 2025-06-09 PROCEDURE — 99214 OFFICE O/P EST MOD 30 MIN: CPT | Performed by: OPHTHALMOLOGY

## 2025-06-09 ASSESSMENT — INTRAOCULAR PRESSURE
OD: 27
OS: 23

## 2025-08-04 ENCOUNTER — OFFICE VISIT (OUTPATIENT)
Facility: LOCATION | Age: 80
End: 2025-08-04
Payer: MEDICARE

## 2025-08-04 DIAGNOSIS — H34.8130 CENTRAL RETINAL VEIN OCCLUSION, BILATERAL, WITH MACULAR EDEMA: Primary | ICD-10-CM

## 2025-08-04 PROCEDURE — 92134 CPTRZ OPH DX IMG PST SGM RTA: CPT | Performed by: OPHTHALMOLOGY

## 2025-08-04 PROCEDURE — 67028 INJECTION EYE DRUG: CPT | Performed by: OPHTHALMOLOGY

## 2025-08-04 ASSESSMENT — INTRAOCULAR PRESSURE
OD: 15
OS: 14